# Patient Record
Sex: FEMALE | Race: BLACK OR AFRICAN AMERICAN | Employment: UNEMPLOYED | URBAN - METROPOLITAN AREA
[De-identification: names, ages, dates, MRNs, and addresses within clinical notes are randomized per-mention and may not be internally consistent; named-entity substitution may affect disease eponyms.]

---

## 2017-02-03 ENCOUNTER — HOSPITAL ENCOUNTER (EMERGENCY)
Facility: CLINIC | Age: 27
Discharge: HOME | End: 2017-02-03
Attending: INTERNAL MEDICINE | Admitting: INTERNAL MEDICINE
Payer: COMMERCIAL

## 2017-02-03 ENCOUNTER — APPOINTMENT (OUTPATIENT)
Dept: ULTRASOUND IMAGING | Facility: CLINIC | Age: 27
End: 2017-02-03
Attending: INTERNAL MEDICINE
Payer: COMMERCIAL

## 2017-02-03 VITALS
TEMPERATURE: 97.9 F | HEART RATE: 98 BPM | WEIGHT: 220 LBS | DIASTOLIC BLOOD PRESSURE: 69 MMHG | RESPIRATION RATE: 16 BRPM | HEIGHT: 63 IN | BODY MASS INDEX: 38.98 KG/M2 | OXYGEN SATURATION: 99 % | SYSTOLIC BLOOD PRESSURE: 110 MMHG

## 2017-02-03 DIAGNOSIS — N83.201 CYST OF RIGHT OVARY: ICD-10-CM

## 2017-02-03 DIAGNOSIS — R10.2 PELVIC PAIN IN FEMALE: ICD-10-CM

## 2017-02-03 LAB
ABO + RH BLD: NORMAL
ABO + RH BLD: NORMAL
ALBUMIN SERPL-MCNC: 3.7 G/DL (ref 3.4–5)
ALBUMIN UR-MCNC: NEGATIVE MG/DL
ALP SERPL-CCNC: 59 U/L (ref 40–150)
ALT SERPL W P-5'-P-CCNC: 18 U/L (ref 0–50)
AMORPH CRY #/AREA URNS HPF: ABNORMAL /HPF
ANION GAP SERPL CALCULATED.3IONS-SCNC: 8 MMOL/L (ref 3–14)
APPEARANCE UR: ABNORMAL
AST SERPL W P-5'-P-CCNC: 15 U/L (ref 0–45)
BASOPHILS # BLD AUTO: 0 10E9/L (ref 0–0.2)
BASOPHILS NFR BLD AUTO: 0.4 %
BILIRUB SERPL-MCNC: 0.6 MG/DL (ref 0.2–1.3)
BILIRUB UR QL STRIP: NEGATIVE
BLD GP AB SCN SERPL QL: NORMAL
BLOOD BANK CMNT PATIENT-IMP: NORMAL
BUN SERPL-MCNC: 7 MG/DL (ref 7–30)
CALCIUM SERPL-MCNC: 8.6 MG/DL (ref 8.5–10.1)
CHLORIDE SERPL-SCNC: 109 MMOL/L (ref 94–109)
CO2 SERPL-SCNC: 25 MMOL/L (ref 20–32)
COLOR UR AUTO: YELLOW
CREAT SERPL-MCNC: 0.57 MG/DL (ref 0.52–1.04)
DIFFERENTIAL METHOD BLD: NORMAL
EOSINOPHIL # BLD AUTO: 0.2 10E9/L (ref 0–0.7)
EOSINOPHIL NFR BLD AUTO: 3.6 %
ERYTHROCYTE [DISTWIDTH] IN BLOOD BY AUTOMATED COUNT: 12.6 % (ref 10–15)
GFR SERPL CREATININE-BSD FRML MDRD: NORMAL ML/MIN/1.7M2
GLUCOSE SERPL-MCNC: 83 MG/DL (ref 70–99)
GLUCOSE UR STRIP-MCNC: NEGATIVE MG/DL
HCG UR QL: NEGATIVE
HCT VFR BLD AUTO: 41.5 % (ref 35–47)
HGB BLD-MCNC: 13.3 G/DL (ref 11.7–15.7)
HGB UR QL STRIP: NEGATIVE
HYALINE CASTS #/AREA URNS LPF: 1 /LPF (ref 0–2)
IMM GRANULOCYTES # BLD: 0 10E9/L (ref 0–0.4)
IMM GRANULOCYTES NFR BLD: 0 %
INR PPP: 1.14 (ref 0.86–1.14)
KETONES UR STRIP-MCNC: NEGATIVE MG/DL
LEUKOCYTE ESTERASE UR QL STRIP: NEGATIVE
LIPASE SERPL-CCNC: 103 U/L (ref 73–393)
LYMPHOCYTES # BLD AUTO: 1.5 10E9/L (ref 0.8–5.3)
LYMPHOCYTES NFR BLD AUTO: 28.1 %
MCH RBC QN AUTO: 28.6 PG (ref 26.5–33)
MCHC RBC AUTO-ENTMCNC: 32 G/DL (ref 31.5–36.5)
MCV RBC AUTO: 89 FL (ref 78–100)
MICRO REPORT STATUS: ABNORMAL
MONOCYTES # BLD AUTO: 0.3 10E9/L (ref 0–1.3)
MONOCYTES NFR BLD AUTO: 5.2 %
MUCOUS THREADS #/AREA URNS LPF: PRESENT /LPF
NEUTROPHILS # BLD AUTO: 3.4 10E9/L (ref 1.6–8.3)
NEUTROPHILS NFR BLD AUTO: 62.7 %
NITRATE UR QL: NEGATIVE
NRBC # BLD AUTO: 0 10*3/UL
NRBC BLD AUTO-RTO: 0 /100
PH UR STRIP: 7 PH (ref 5–7)
PLATELET # BLD AUTO: 203 10E9/L (ref 150–450)
POTASSIUM SERPL-SCNC: 4 MMOL/L (ref 3.4–5.3)
PROT SERPL-MCNC: 7.5 G/DL (ref 6.8–8.8)
RBC # BLD AUTO: 4.65 10E12/L (ref 3.8–5.2)
RBC #/AREA URNS AUTO: 2 /HPF (ref 0–2)
SODIUM SERPL-SCNC: 142 MMOL/L (ref 133–144)
SP GR UR STRIP: 1.01 (ref 1–1.03)
SPECIMEN EXP DATE BLD: NORMAL
SPECIMEN SOURCE: ABNORMAL
SQUAMOUS #/AREA URNS AUTO: 1 /HPF (ref 0–1)
TSH SERPL DL<=0.005 MIU/L-ACNC: 2.26 MU/L (ref 0.4–4)
URN SPEC COLLECT METH UR: ABNORMAL
UROBILINOGEN UR STRIP-MCNC: NORMAL MG/DL (ref 0–2)
WBC # BLD AUTO: 5.3 10E9/L (ref 4–11)
WBC #/AREA URNS AUTO: 1 /HPF (ref 0–2)
WET PREP SPEC: ABNORMAL

## 2017-02-03 PROCEDURE — 93976 VASCULAR STUDY: CPT

## 2017-02-03 PROCEDURE — 25000132 ZZH RX MED GY IP 250 OP 250 PS 637: Performed by: INTERNAL MEDICINE

## 2017-02-03 PROCEDURE — 86850 RBC ANTIBODY SCREEN: CPT | Performed by: INTERNAL MEDICINE

## 2017-02-03 PROCEDURE — 85610 PROTHROMBIN TIME: CPT | Performed by: INTERNAL MEDICINE

## 2017-02-03 PROCEDURE — 99284 EMERGENCY DEPT VISIT MOD MDM: CPT | Mod: 25 | Performed by: INTERNAL MEDICINE

## 2017-02-03 PROCEDURE — 80053 COMPREHEN METABOLIC PANEL: CPT | Performed by: INTERNAL MEDICINE

## 2017-02-03 PROCEDURE — 96374 THER/PROPH/DIAG INJ IV PUSH: CPT | Performed by: INTERNAL MEDICINE

## 2017-02-03 PROCEDURE — 84443 ASSAY THYROID STIM HORMONE: CPT | Performed by: INTERNAL MEDICINE

## 2017-02-03 PROCEDURE — 87591 N.GONORRHOEAE DNA AMP PROB: CPT | Performed by: INTERNAL MEDICINE

## 2017-02-03 PROCEDURE — 99284 EMERGENCY DEPT VISIT MOD MDM: CPT | Mod: Z6 | Performed by: INTERNAL MEDICINE

## 2017-02-03 PROCEDURE — 87491 CHLMYD TRACH DNA AMP PROBE: CPT | Performed by: INTERNAL MEDICINE

## 2017-02-03 PROCEDURE — 83690 ASSAY OF LIPASE: CPT | Performed by: INTERNAL MEDICINE

## 2017-02-03 PROCEDURE — 87210 SMEAR WET MOUNT SALINE/INK: CPT | Performed by: INTERNAL MEDICINE

## 2017-02-03 PROCEDURE — 96361 HYDRATE IV INFUSION ADD-ON: CPT | Performed by: INTERNAL MEDICINE

## 2017-02-03 PROCEDURE — 81001 URINALYSIS AUTO W/SCOPE: CPT | Performed by: INTERNAL MEDICINE

## 2017-02-03 PROCEDURE — 81025 URINE PREGNANCY TEST: CPT | Performed by: INTERNAL MEDICINE

## 2017-02-03 PROCEDURE — 96360 HYDRATION IV INFUSION INIT: CPT | Performed by: INTERNAL MEDICINE

## 2017-02-03 PROCEDURE — 86901 BLOOD TYPING SEROLOGIC RH(D): CPT | Performed by: INTERNAL MEDICINE

## 2017-02-03 PROCEDURE — 86900 BLOOD TYPING SEROLOGIC ABO: CPT | Performed by: INTERNAL MEDICINE

## 2017-02-03 PROCEDURE — 85025 COMPLETE CBC W/AUTO DIFF WBC: CPT | Performed by: INTERNAL MEDICINE

## 2017-02-03 PROCEDURE — 25000128 H RX IP 250 OP 636: Performed by: INTERNAL MEDICINE

## 2017-02-03 RX ORDER — KETOROLAC TROMETHAMINE 30 MG/ML
30 INJECTION, SOLUTION INTRAMUSCULAR; INTRAVENOUS ONCE
Status: COMPLETED | OUTPATIENT
Start: 2017-02-03 | End: 2017-02-03

## 2017-02-03 RX ORDER — TRAMADOL HYDROCHLORIDE 50 MG/1
50 TABLET ORAL ONCE
Status: COMPLETED | OUTPATIENT
Start: 2017-02-03 | End: 2017-02-03

## 2017-02-03 RX ORDER — TRAMADOL HYDROCHLORIDE 50 MG/1
50 TABLET ORAL EVERY 6 HOURS PRN
Qty: 10 TABLET | Refills: 0 | Status: SHIPPED | OUTPATIENT
Start: 2017-02-03 | End: 2017-07-11

## 2017-02-03 RX ADMIN — KETOROLAC TROMETHAMINE 30 MG: 30 INJECTION, SOLUTION INTRAMUSCULAR at 08:45

## 2017-02-03 RX ADMIN — TRAMADOL HYDROCHLORIDE 50 MG: 50 TABLET, COATED ORAL at 11:21

## 2017-02-03 RX ADMIN — SODIUM CHLORIDE 1000 ML: 9 INJECTION, SOLUTION INTRAVENOUS at 08:45

## 2017-02-03 ASSESSMENT — ENCOUNTER SYMPTOMS
ABDOMINAL PAIN: 1
DYSURIA: 0
VOMITING: 0
NAUSEA: 0
FEVER: 0

## 2017-02-03 NOTE — DISCHARGE INSTRUCTIONS
Please make an appointment to follow up with Your Gynecologist in 5-10 days even if entirely better.

## 2017-02-03 NOTE — ED AVS SNAPSHOT
[facility], Emergency Department    [address]    [place] [address]    Phone:  [phone]    Fax:  [phone]                                       Renetta Jolly   MRN: [id number]    Department:  [facility], Emergency Department   Date of Visit:  2/3/2017           Patient Information     Date Of Birth          1990        Your diagnoses for this visit were:     Pelvic pain in female     Cyst of right ovary        You were seen by Gayatri Hill MD.        Discharge Instructions       Please make an appointment to follow up with Your Gynecologist in 5-10 days even if entirely better.     Discharge References/Attachments     PELVIC PAIN, UNKNOWN CAUSE (ENGLISH)    OVARIAN CYST (ENGLISH)      24 Hour Appointment Hotline       To make an appointment at any [facility] clinic, call [phone] ([phone]). If you don't have a family doctor or clinic, we will help you find one. [facility] clinics are conveniently located to serve the needs of you and your family.             Review of your medicines      CONTINUE these medicines which may have CHANGED, or have new prescriptions. If we are uncertain of the size of tablets/capsules you have at home, strength may be listed as something that might have changed.        Dose / Directions Last dose taken    traMADol 50 MG tablet   Commonly known as:  ULTRAM   Dose:  50 mg   What changed:    - how much to take  - when to take this   Quantity:  10 tablet        Take 1 tablet (50 mg) by mouth every 6 hours as needed for moderate pain   Refills:  0          Our records show that you are taking the medicines listed below. If these are incorrect, please call your family doctor or clinic.        Dose / Directions Last dose taken    LEVOTHYROXINE SODIUM PO   Dose:  88 mcg        Take 88 mcg by mouth daily Patient does not know the dose.   Refills:  0        NAPROXEN PO        Take by mouth as needed for moderate pain   Refills:  0        polyethylene  glycol powder   Commonly known as:  MIRALAX   Dose:  1 capful   Quantity:  510 g        Take 17 g (1 capful) by mouth daily   Refills:  1        sennosides 8.6 MG tablet   Commonly known as:  SENOKOT   Dose:  2 tablet   Quantity:  120 tablet        Take 2 tablets by mouth 2 times daily   Refills:  1        TYLENOL PO   Dose:  500 mg        Take 500 mg by mouth   Refills:  0                Prescriptions were sent or printed at these locations (1 Prescription)                   Other Prescriptions                Printed at Department/Unit printer (1 of 1)         traMADol (ULTRAM) 50 MG tablet                Procedures and tests performed during your visit     ABO/Rh type and screen    CBC with platelets differential    Chlamydia trachomatis PCR    Comprehensive metabolic panel    HCG qualitative urine    INR    Lipase    Neisseria gonorrhoea PCR    Prep for procedure - pelvic exam    TSH with free T4 reflex    UA reflex to Microscopic and Culture    US Pelvic Complete w Transvaginal & Abd/Pel Duplex Limited    Wet prep      Orders Needing Specimen Collection     None      Pending Results     Date and Time Order Name Status Description    2/3/2017 0853 Neisseria gonorrhoea PCR In process     2/3/2017 0853 Chlamydia trachomatis PCR In process             Pending Culture Results     Date and Time Order Name Status Description    2/3/2017 0853 Neisseria gonorrhoea PCR In process     2/3/2017 0853 Chlamydia trachomatis PCR In process             Thank you for choosing [facility]       Thank you for choosing [facility] for your care. Our goal is always to provide you with excellent care. Hearing back from our patients is one way we can continue to improve our services. Please take a few minutes to complete the written survey that you may receive in the mail after you visit with us. Thank you!        [employer] Information     [employer] lets you send messages to your doctor, view your test results, renew your prescriptions, schedule  "appointments and more. To sign up, go to [email or web address] . Click on \"Log in\" on the left side of the screen, which will take you to the Welcome page. Then click on \"Sign up Now\" on the right side of the page.     You will be asked to enter the access code listed below, as well as some personal information. Please follow the directions to create your username and password.     Your access code is: [email or web address]  Expires: 2017 11:53 AM     Your access code will  in 90 days. If you need help or a new code, please call your [facility] clinic or [phone].        Care EveryWhere ID     This is your Care EveryWhere ID. This could be used by other organizations to access your [facility] medical records  [id number]        After Visit Summary       This is your record. Keep this with you and show to your community pharmacist(s) and doctor(s) at your next visit.                  "

## 2017-02-03 NOTE — ED AVS SNAPSHOT
[facility], [place], Emergency Department    [address]    [facility] [address]    Phone:  [phone]    Fax:  [phone]                                       Renetta Jolly   MRN: [id number]    Department:  [facility], Emergency Department   Date of Visit:  2/3/2017           After Visit Summary Signature Page     I have received my discharge instructions, and my questions have been answered. I have discussed any challenges I see with this plan with the nurse or doctor.    ..........................................................................................................................................  Patient/Patient Representative Signature      ..........................................................................................................................................  Patient Representative Print Name and Relationship to Patient    ..................................................               ................................................  Date                                            Time    ..........................................................................................................................................  Reviewed by Signature/Title    ...................................................              ..............................................  Date                                                            Time

## 2017-02-03 NOTE — ED PROVIDER NOTES
"  History     Chief Complaint   Patient presents with     Abdominal Pain     RLQ abd pain feels like her right ovarian cyst that she was dx with.  Pain started yesterday.     HPI  Renetta Jolly is a 26 year old female with a history of right sided ovarian cyst, last ultrasound back in 10/2016, presents now with a 2 day history of right and left lower abdominal discomfort and also pelvic discomfort. She denies any discharge or any dysuria or any nausea, vomiting or fevers. She is worried that the cyst may be getting larger.     I have reviewed the Medications, Allergies, Past Medical and Surgical History, and Social History in the "[employer] system.    Past Medical History   Diagnosis Date     Intermittent asthma 2013     SAB (spontaneous )      Thyroid disease        Past Surgical History   Procedure Laterality Date     No history of surgery         Family History   Problem Relation Age of Onset     DIABETES Maternal Uncle      Asthma Mother      CEREBROVASCULAR DISEASE Father        Social History   Substance Use Topics     Smoking status: Never Smoker      Smokeless tobacco: Never Used     Alcohol Use: No     No current facility-administered medications for this encounter.     Current Outpatient Prescriptions   Medication     NAPROXEN PO     traMADol (ULTRAM) 50 MG tablet     LEVOTHYROXINE SODIUM PO     sennosides (SENOKOT) 8.6 MG tablet     polyethylene glycol (MIRALAX) powder     Acetaminophen (TYLENOL PO)      No Known Allergies    Review of Systems   Constitutional: Negative for fever.   Gastrointestinal: Positive for abdominal pain (bilateral lower). Negative for nausea and vomiting.   Genitourinary: Positive for pelvic pain. Negative for dysuria and vaginal discharge.   All other systems reviewed and are negative.      Physical Exam   BP: 110/69 mmHg  Pulse: 98  Temp: 97.9  F (36.6  C)  Resp: 16  Height: 160 cm (5' 3\")  Weight: 99.791 kg (220 lb)  SpO2: 99 %  Physical Exam   Constitutional: No " distress.   HENT:   Head: Atraumatic.   Mouth/Throat: Oropharynx is clear and moist. No oropharyngeal exudate.   Eyes: Pupils are equal, round, and reactive to light. No scleral icterus.   Neck: Neck supple. No JVD present.   Cardiovascular: Normal rate, normal heart sounds and intact distal pulses.  Exam reveals no gallop and no friction rub.    No murmur heard.  Pulmonary/Chest: Effort normal and breath sounds normal. No respiratory distress. She has no wheezes. She has no rales. She exhibits no tenderness.   Abdominal: Soft. Bowel sounds are normal. She exhibits no distension and no mass. There is no hepatosplenomegaly. There is tenderness in the right lower quadrant and suprapubic area. There is no rigidity, no rebound, no guarding, no CVA tenderness, no tenderness at McBurney's point and negative Norris's sign. No hernia. Hernia confirmed negative in the right inguinal area and confirmed negative in the left inguinal area.       Genitourinary: Vagina normal and uterus normal. Cervix exhibits discharge. Cervix exhibits no motion tenderness and no friability. Right adnexum displays no mass, no tenderness and no fullness. Left adnexum displays no mass, no tenderness and no fullness.   Musculoskeletal: She exhibits no edema or tenderness.   Lymphadenopathy:        Right: No inguinal adenopathy present.        Left: No inguinal adenopathy present.   Skin: Skin is warm. No rash noted. She is not diaphoretic.       ED Course     Procedures        Results for orders placed or performed during the hospital encounter of 02/03/17 (from the past 24 hour(s))   UA reflex to Microscopic and Culture     Status: Abnormal    Collection Time: 02/03/17  8:17 AM   Result Value Ref Range    Color Urine Yellow     Appearance Urine Slightly Cloudy     Glucose Urine Negative NEG mg/dL    Bilirubin Urine Negative NEG    Ketones Urine Negative NEG mg/dL    Specific Gravity Urine 1.013 1.003 - 1.035    Blood Urine Negative NEG    pH Urine  7.0 5.0 - 7.0 pH    Protein Albumin Urine Negative NEG mg/dL    Urobilinogen mg/dL Normal 0.0 - 2.0 mg/dL    Nitrite Urine Negative NEG    Leukocyte Esterase Urine Negative NEG    Source Midstream Urine     RBC Urine 2 0 - 2 /HPF    WBC Urine 1 0 - 2 /HPF    Squamous Epithelial /HPF Urine 1 0 - 1 /HPF    Mucous Urine Present (A) NEG /LPF    Hyaline Casts 1 0 - 2 /LPF    Amorphous Crystals Few (A) NEG /HPF   HCG qualitative urine     Status: None    Collection Time: 02/03/17  8:17 AM   Result Value Ref Range    HCG Qual Urine Negative NEG   CBC with platelets differential     Status: None    Collection Time: 02/03/17  8:33 AM   Result Value Ref Range    WBC 5.3 4.0 - 11.0 10e9/L    RBC Count 4.65 3.8 - 5.2 10e12/L    Hemoglobin 13.3 11.7 - 15.7 g/dL    Hematocrit 41.5 35.0 - 47.0 %    MCV 89 78 - 100 fl    MCH 28.6 26.5 - 33.0 pg    MCHC 32.0 31.5 - 36.5 g/dL    RDW 12.6 10.0 - 15.0 %    Platelet Count 203 150 - 450 10e9/L    Diff Method Automated Method     % Neutrophils 62.7 %    % Lymphocytes 28.1 %    % Monocytes 5.2 %    % Eosinophils 3.6 %    % Basophils 0.4 %    % Immature Granulocytes 0.0 %    Nucleated RBCs 0 0 /100    Absolute Neutrophil 3.4 1.6 - 8.3 10e9/L    Absolute Lymphocytes 1.5 0.8 - 5.3 10e9/L    Absolute Monocytes 0.3 0.0 - 1.3 10e9/L    Absolute Eosinophils 0.2 0.0 - 0.7 10e9/L    Absolute Basophils 0.0 0.0 - 0.2 10e9/L    Abs Immature Granulocytes 0.0 0 - 0.4 10e9/L    Absolute Nucleated RBC 0.0    INR     Status: None    Collection Time: 02/03/17  8:33 AM   Result Value Ref Range    INR 1.14 0.86 - 1.14   ABO/Rh type and screen     Status: None    Collection Time: 02/03/17  8:33 AM   Result Value Ref Range    ABO O     RH(D)  Pos     Antibody Screen Neg     Test Valid Only At       [facility]    Specimen Expires 02/06/2017    Comprehensive metabolic panel     Status: None    Collection Time: 02/03/17  8:33 AM   Result Value Ref Range    Sodium 142  133 - 144 mmol/L    Potassium 4.0 3.4 - 5.3 mmol/L    Chloride 109 94 - 109 mmol/L    Carbon Dioxide 25 20 - 32 mmol/L    Anion Gap 8 3 - 14 mmol/L    Glucose 83 70 - 99 mg/dL    Urea Nitrogen 7 7 - 30 mg/dL    Creatinine 0.57 0.52 - 1.04 mg/dL    GFR Estimate >90  Non  GFR Calc   >60 mL/min/1.7m2    GFR Estimate If Black >90   GFR Calc   >60 mL/min/1.7m2    Calcium 8.6 8.5 - 10.1 mg/dL    Bilirubin Total 0.6 0.2 - 1.3 mg/dL    Albumin 3.7 3.4 - 5.0 g/dL    Protein Total 7.5 6.8 - 8.8 g/dL    Alkaline Phosphatase 59 40 - 150 U/L    ALT 18 0 - 50 U/L    AST 15 0 - 45 U/L   Lipase     Status: None    Collection Time: 02/03/17  8:33 AM   Result Value Ref Range    Lipase 103 73 - 393 U/L   TSH with free T4 reflex     Status: None    Collection Time: 02/03/17  8:33 AM   Result Value Ref Range    TSH 2.26 0.40 - 4.00 mU/L   US Pelvic Complete w Transvaginal & Abd/Pel Duplex Limited     Status: None    Collection Time: 02/03/17  9:52 AM    Narrative    ULTRASOUND PELVIS COMPLETE WITH TRANSVAGINAL IMAGING AND DOPPLER  LIMITED  2/3/2017 9:52 AM     HISTORY: Pelvic pain.    FINDINGS:  Transvaginal images were performed to better evaluate the  patient's uterus, ovaries and endometrial stripe.    The uterus is normal in size measuring 7.8 x 4.3 x 5.1 cm. No fibroids  are evident. Endometrial stripe measures 11 mm and is normal for  patient's age. Endometrium demonstrates a normal configuration on  3-dimensional imaging. The right ovary measures 2.6 x 3.1 x 2.2 cm and  contains a complex cystic lesion measuring 2.0 cm, possibly a corpus  luteal cyst. The left ovary is normal measuring 2.4 x 3.3 x 0.8 cm.  Color Doppler waveform analysis demonstrates normal arterial waveforms  within each ovary. Large simple-appearing cystic lesion is noted in  the right adnexa just lateral to the right ovary, possibly a  paraovarian cyst or pedunculated cyst, measuring 3.0 x 3.4 x 2.2 cm.  No associated abnormal  color Doppler flow. A small amount of free  pelvic fluid is present.      Impression    IMPRESSION:   1. Probable corpus luteal cyst right ovary and a larger pedunculated  or paraovarian simple-appearing cystic lesion measuring 3.4 cm.  Followup ultrasound in two or three months could be performed to  assess for resolution.  2. No ultrasound evidence of ovarian torsion. Uterus and endometrial  stripe are unremarkable. Left ovary is within normal limits.    BECKY SIMON MD   Wet prep     Status: Abnormal    Collection Time: 02/03/17 11:00 AM   Result Value Ref Range    Specimen Description Genital     Wet Prep (A)      Few WBC'S seen  Rare Clue cells seen  No yeast seen  No Trichomonas seen      Micro Report Status FINAL 02/03/2017              Labs Ordered and Resulted from Time of ED Arrival Up to the Time of Departure from the ED   UA MACROSCOPIC WITH REFLEX TO MICRO AND CULTURE - Abnormal; Notable for the following:     Mucous Urine Present (*)     Amorphous Crystals Few (*)     All other components within normal limits   WET PREP - Abnormal; Notable for the following:     Wet Prep   (*)     Value: Few WBC'S seen  Rare Clue cells seen  No yeast seen  No Trichomonas seen      All other components within normal limits   CBC WITH PLATELETS DIFFERENTIAL   INR   COMPREHENSIVE METABOLIC PANEL   LIPASE   TSH WITH FREE T4 REFLEX   HCG QUALITATIVE URINE   PREP FOR PROCEDURE   ABO/RH TYPE AND SCREEN   CHLAMYDIA TRACHOMATIS PCR   NEISSERIA GONORRHOEAE PCR       Assessments & Plan (with Medical Decision Making)  Right greater than left pelvic pain with ovarian cysts, no evidence of tortion, improving with toradol and tramadol, wet prep and other labs ok, will DC with tramadol prn and follow up with her Gyn.       I have reviewed the nursing notes.    I have reviewed the findings, diagnosis, plan and need for follow up with the patient.    Discharge Medication List as of 2/3/2017 11:55 AM          Final diagnoses:   Pelvic  pain in female   Cyst of right ovary   I, Latia Martinez, am serving as a trained medical scribe to document services personally performed by Gayatri Hill MD, based on the provider's statements to me.      IGayatri MD, was physically present and have reviewed and verified the accuracy of this note documented by Latia Martinez.       2/3/2017   [facility], [place], EMERGENCY DEPARTMENT      Gayatri Hill MD  02/03/17 2912

## 2017-02-05 LAB
C TRACH DNA SPEC QL NAA+PROBE: NORMAL
N GONORRHOEA DNA SPEC QL NAA+PROBE: NORMAL
SPECIMEN SOURCE: NORMAL
SPECIMEN SOURCE: NORMAL

## 2017-05-30 ENCOUNTER — TELEPHONE (OUTPATIENT)
Dept: FAMILY MEDICINE | Facility: CLINIC | Age: 27
End: 2017-05-30

## 2017-05-30 NOTE — TELEPHONE ENCOUNTER
Quality letter mailed to patient as a reminder of HM items due, and ACT questionnaire also mailed along with this letter.  ELIZABETH Caruso MA

## 2017-05-30 NOTE — TELEPHONE ENCOUNTER
Panel Management Review      Patient has the following on her problem list:     Asthma review     ACT Total Scores 11/13/2013   ACT TOTAL SCORE 25   ASTHMA ER VISITS 0 = None   ASTHMA HOSPITALIZATIONS 0 = None      1. Is Asthma diagnosis on the Problem List? Yes    2. Is Asthma listed on Health Maintenance? Yes    3. Patient is due for:  ACT      Composite cancer screening  Chart review shows that this patient is due/due soon for the following Pap Smear  Summary:    Patient is due/failing the following:   ACT and PAP    Action needed:   Patient needs office visit for physical with pap screen. Patient needs to do ACT.    Type of outreach:    Phone, left message for patient to call back.     Questions for provider review:    None                                                                                                                                    ELIZABETH Caruso MA       Chart routed to Care Team .

## 2017-05-30 NOTE — LETTER
May 30, 2017    Renetta Jolly  [address]    [facility] [address]    Dear Renetta    We care about your health and have reviewed your health plan. We have reviewed your medical conditions, medication list, and lab results and are making recommendations based on this review, to better manage your health.    You are in particular need of attention regarding:  - Your Asthma  - Scheduling a Physical with a Cervical Cancer Screening (Pap Smear) age 64 and younger [phone]  ACT questionnaire mailed along with this letter    Here is a list of Health Maintenance topics that are due now or due soon:  Health Maintenance Due   Topic Date Due     ASTHMA CONTROL TEST Q6 MOS  05/13/2014     ASTHMA ACTION PLAN Q1 YR  11/13/2014     PAP SCREENING Q3 YR (SYSTEM ASSIGNED)  04/29/2017     We will be calling you in the next couple of weeks to help you schedule any appointments that are needed.  Please call us at [phone] (or use [employer]) to address the above recommendations.     Thank you for trusting [facility] and we appreciate the opportunity to serve you.  We look forward to supporting your healthcare needs in the future.    Healthy Regards,    Melisa Butler, CNP

## 2017-05-30 NOTE — LETTER
June 5, 2017    Renetta Jolly  [address]    [facility] [address]      Dear Renetta Jolly,     We have tried to contact you about your health, but have been unable to reach you.  Please call us as soon as possible so we can provide you with the best care possible.  We will continue to check in with you throughout the year to complete these items of care, if you are not able to complete these items at this time.  If you would like to complete the missing items for your care, please contact us at [phone].    We recommend the following:  -schedule a PAP SMEAR EXAM which is due.  Please disregard this reminder if you have had this exam elsewhere within the last year.  It would be helpful for us to have a copy of your recent pap smear report in our file so that we can best coordinate your care.  Please fill the the ACT questionnaire that we sent you a week ago and please mail back, thank you.  Sincerely,     Your Care Team at [facility]

## 2017-06-27 ENCOUNTER — OFFICE VISIT (OUTPATIENT)
Dept: FAMILY MEDICINE | Facility: CLINIC | Age: 27
End: 2017-06-27
Payer: COMMERCIAL

## 2017-06-27 VITALS
HEIGHT: 63 IN | OXYGEN SATURATION: 99 % | BODY MASS INDEX: 37.03 KG/M2 | HEART RATE: 79 BPM | WEIGHT: 209 LBS | SYSTOLIC BLOOD PRESSURE: 97 MMHG | TEMPERATURE: 98.9 F | DIASTOLIC BLOOD PRESSURE: 65 MMHG

## 2017-06-27 DIAGNOSIS — R10.31 RLQ ABDOMINAL PAIN: Primary | ICD-10-CM

## 2017-06-27 DIAGNOSIS — Z87.42 HISTORY OF OVARIAN CYST: ICD-10-CM

## 2017-06-27 LAB
ALBUMIN UR-MCNC: ABNORMAL MG/DL
APPEARANCE UR: ABNORMAL
BACTERIA #/AREA URNS HPF: ABNORMAL /HPF
BETA HCG QUAL IFA URINE: NEGATIVE
BILIRUB UR QL STRIP: ABNORMAL
COLOR UR AUTO: YELLOW
GLUCOSE UR STRIP-MCNC: NEGATIVE MG/DL
HGB UR QL STRIP: NEGATIVE
KETONES UR STRIP-MCNC: ABNORMAL MG/DL
LEUKOCYTE ESTERASE UR QL STRIP: NEGATIVE
MUCOUS THREADS #/AREA URNS LPF: PRESENT /LPF
NITRATE UR QL: NEGATIVE
NON-SQ EPI CELLS #/AREA URNS LPF: ABNORMAL /LPF
PH UR STRIP: 5.5 PH (ref 5–7)
RBC #/AREA URNS AUTO: ABNORMAL /HPF (ref 0–2)
SP GR UR STRIP: >1.03 (ref 1–1.03)
URN SPEC COLLECT METH UR: ABNORMAL
UROBILINOGEN UR STRIP-ACNC: 1 EU/DL (ref 0.2–1)
WBC #/AREA URNS AUTO: ABNORMAL /HPF (ref 0–2)

## 2017-06-27 PROCEDURE — 99214 OFFICE O/P EST MOD 30 MIN: CPT | Performed by: NURSE PRACTITIONER

## 2017-06-27 PROCEDURE — 84703 CHORIONIC GONADOTROPIN ASSAY: CPT | Performed by: NURSE PRACTITIONER

## 2017-06-27 PROCEDURE — 81001 URINALYSIS AUTO W/SCOPE: CPT | Performed by: NURSE PRACTITIONER

## 2017-06-27 ASSESSMENT — ENCOUNTER SYMPTOMS
CONSTIPATION: 0
SHORTNESS OF BREATH: 0
FALLS: 0
DIARRHEA: 0
HEMATURIA: 0
NAUSEA: 1
COUGH: 0
FREQUENCY: 0
HEARTBURN: 0
ABDOMINAL PAIN: 1
BACK PAIN: 1
DYSURIA: 0

## 2017-06-27 NOTE — PATIENT INSTRUCTIONS
Please call [facility] to schedule your test: [phone]    We will talk after I see the results of your test    Please schedule an appointment to establish care either in our clinic or with an OB/GYN for now

## 2017-06-27 NOTE — NURSING NOTE
"Chief Complaint   Patient presents with     Abdominal Pain       Initial BP 97/65 (BP Location: Right arm, Cuff Size: Adult Large)  Pulse 79  Temp 98.9  F (37.2  C) (Oral)  Ht 5' 3\" (1.6 m)  Wt 209 lb (94.8 kg)  LMP 06/05/2017 (Exact Date)  SpO2 99%  BMI 37.02 kg/m2 Estimated body mass index is 37.02 kg/(m^2) as calculated from the following:    Height as of this encounter: 5' 3\" (1.6 m).    Weight as of this encounter: 209 lb (94.8 kg).  Medication Reconciliation: complete       Jelena Jackson CMA      "

## 2017-06-27 NOTE — PROGRESS NOTES
HPI    SUBJECTIVE:                                                    Renetta Jolly is a 26 year old female who presents to clinic today for the following health issues:      Abdominal Pain      Duration: 3 days    Description (location/character/radiation): RLQ       Associated flank pain: Yes    Intensity:  severe    Accompanying signs and symptoms:        Fever/Chills: no        Gas/Bloating: YES, bloating       Nausea/vomitting: YES, nausea       Diarrhea: no        Dysuria or Hematuria: no     History (previous similar pain/trauma/previous testing): Yes,history of right sided ovarian cyst      Precipitating or alleviating factors:       Pain worse with eating/BM/urination: No       Pain relieved by BM: no     Therapies tried and outcome: None    LMP:  2017        Patient with history of right ovarian cyst presents with generalized abdominal pain which started three days ago.  It is worse in her lower abdomen and is more severe towards the lower right. Her pain is 10/10 with lying down and rates 9/10 when sitting and ambulating. She had difficulty sleeping last night so she took Tramadol, but it did not relief her pain.  She reported nausea along with the pain. She denies fever, vomiting, burning urination, hematuria, vaginal itch and discharge. She does complain of vaginal pain and painful coitus.     Patient also reports back pain for about three days that radiates to the right leg which gets worse with massage, rt leg extension and bending. She currently does not work and is not sure what caused the back pain.       Past Medical History:   Diagnosis Date     Intermittent asthma 2013     SAB (spontaneous )      Thyroid disease      Past Surgical History:   Procedure Laterality Date     NO HISTORY OF SURGERY       Social History   Substance Use Topics     Smoking status: Never Smoker     Smokeless tobacco: Never Used     Alcohol use No     Current Outpatient Prescriptions   Medication Sig  "Dispense Refill     NAPROXEN PO Take by mouth as needed for moderate pain       traMADol (ULTRAM) 50 MG tablet Take 1 tablet (50 mg) by mouth every 6 hours as needed for moderate pain 10 tablet 0     sennosides (SENOKOT) 8.6 MG tablet Take 2 tablets by mouth 2 times daily 120 tablet 1     polyethylene glycol (MIRALAX) powder Take 17 g (1 capful) by mouth daily 510 g 1     Acetaminophen (TYLENOL PO) Take 500 mg by mouth       LEVOTHYROXINE SODIUM PO Take 88 mcg by mouth daily Patient does not know the dose.       Allergies   Allergen Reactions     No Known Allergies        Reviewed and updated as needed this visit by clinical staff and provider      Review of Systems   Respiratory: Negative for cough and shortness of breath.    Cardiovascular: Negative for chest pain.   Gastrointestinal: Positive for abdominal pain and nausea. Negative for constipation, diarrhea and heartburn.   Genitourinary: Negative for dysuria, frequency and hematuria.   Musculoskeletal: Positive for back pain. Negative for falls.        Back pain radiating to right leg.   Skin: Negative for rash.         BP 97/65 (BP Location: Right arm, Cuff Size: Adult Large)  Pulse 79  Temp 98.9  F (37.2  C) (Oral)  Ht 5' 3\" (1.6 m)  Wt 209 lb (94.8 kg)  LMP 06/05/2017 (Exact Date)  SpO2 99%  BMI 37.02 kg/m2      Physical Exam   Constitutional: She is well-developed, well-nourished, and in no distress.   HENT:   Head: Normocephalic and atraumatic.   Eyes: Conjunctivae are normal.   Cardiovascular: Normal rate, regular rhythm and normal heart sounds.    No murmur heard.  Pulmonary/Chest: Effort normal. No respiratory distress.   Abdominal: Soft. She exhibits no distension. There is tenderness.   Generalized tenderness but greatest of RLQ   Musculoskeletal: Normal range of motion.   Neurological: She is alert.   Skin: Skin is warm and dry.   Psychiatric: Mood and affect normal.         Assessment and Plan:       ICD-10-CM    1. RLQ abdominal pain R10.31 " UA with Microscopic reflex to Culture     Beta HCG qual IFA urine     US Pelvic Complete w Transvaginal     OB/GYN REFERRAL   2. History of ovarian cyst Z87.42        Unknown etiology of abd pain but most likely ovarian cyst  Neg UA and UPT   Will complete US considering her history of ovarian cyst   Also referred to OB/GYN as she will likely need f/u regarding cyst and also needs routine exam plus she is trying to get pregnant currently       Christopher Lewis, APRN, CNP  [facility]

## 2017-06-27 NOTE — PROGRESS NOTES
"  SUBJECTIVE:                                                    Renetta Jolly is a 26 year old female who presents to clinic today for the following health issues:      Abdominal Pain      Duration: 3 days    Description (location/character/radiation): RLQ       Associated flank pain: Yes    Intensity:  severe    Accompanying signs and symptoms:        Fever/Chills: no        Gas/Bloating: YES, bloating       Nausea/vomitting: YES, nausea       Diarrhea: no        Dysuria or Hematuria: no     History (previous similar pain/trauma/previous testing): Yes,history of right sided ovarian cyst      Precipitating or alleviating factors:       Pain worse with eating/BM/urination: No       Pain relieved by BM: no     Therapies tried and outcome: None    LMP:  06/05/2017      {additional problems for provider to add:779757}    Problem list and histories reviewed & adjusted, as indicated.  Additional history: {NONE - AS DOCUMENTED:372906::\"as documented\"}    {HIST REVIEW/ LINKS 2:067573}                 {PROVIDER CHARTING PREFERENCE:151114}  "

## 2017-06-27 NOTE — MR AVS SNAPSHOT
After Visit Summary   6/27/2017    Renetta Jolly    MRN: [id number]           Patient Information     Date Of Birth          1990        Visit Information        Provider Department      6/27/2017 9:30 AM Christopher Lewis APRN CNP [facility]        Today's Diagnoses     RLQ abdominal pain    -  1    History of ovarian cyst          Care Instructions    Please call [facility] to schedule your test: [phone]    We will talk after I see the results of your test    Please schedule an appointment to establish care either in our clinic or with an OB/GYN for now             Follow-ups after your visit        Additional Services     OB/GYN REFERRAL       Your provider has referred you to:  [facility]: [facility] [phone]  [email or web address]    Please be aware that coverage of these services is subject to the terms and limitations of your health insurance plan.  Call member services at your health plan with any benefit or coverage questions.      Please bring the following with you to your appointment:    (1) Any X-Rays, CTs or MRIs which have been performed.  Contact the facility where they were done to arrange for  prior to your scheduled appointment.   (2) List of current medications   (3) This referral request   (4) Any documents/labs given to you for this referral                  Future tests that were ordered for you today     Open Future Orders        Priority Expected Expires Ordered    US Pelvic Complete w Transvaginal Routine  6/27/2018 6/27/2017            Who to contact     If you have questions or need follow up information about today's clinic visit or your schedule please contact [facility] directly at [phone].  Normal or non-critical lab and imaging results will be communicated to you by MyChart, letter or phone within 4 business days after the clinic has received the results. If  "you do not hear from us within 7 days, please contact the clinic through [employer]hart or phone. If you have a critical or abnormal lab result, we will notify you by phone as soon as possible.  Submit refill requests through [employer] or call your pharmacy and they will forward the refill request to us. Please allow 3 business days for your refill to be completed.          Additional Information About Your Visit        Care EveryWhere ID     This is your Care EveryWhere ID. This could be used by other organizations to access your [facility] medical records  [id number]        Your Vitals Were     Pulse Temperature Height Last Period Pulse Oximetry BMI (Body Mass Index)    79 98.9  F (37.2  C) (Oral) 5' 3\" (1.6 m) 06/05/2017 (Exact Date) 99% 37.02 kg/m2       Blood Pressure from Last 3 Encounters:   06/27/17 97/65   02/03/17 110/69   12/04/16 109/49    Weight from Last 3 Encounters:   06/27/17 209 lb (94.8 kg)   02/03/17 220 lb (99.8 kg)   12/04/16 226 lb (102.5 kg)              We Performed the Following     Beta HCG qual IFA urine     OB/GYN REFERRAL     UA with Microscopic reflex to Culture        Primary Care Provider    Physician No Ref-Primary       No address on file        Equal Access to Services     LD PUTNAM : Hadii lia pintoo Noa, waaxda luqadaha, qaybta kaalmada adeegyada, reece gupta. So [facility] [phone].    ATENCIÓN: Si habla español, tiene a velazquez disposición servicios gratuitos de asistencia lingüística. Llame al [phone].    We comply with applicable federal civil rights laws and [place] laws. We do not discriminate on the basis of race, color, national origin, age, disability sex, sexual orientation or gender identity.            Thank you!     Thank you for choosing [facility]  for your care. Our goal is always to provide you with excellent care. Hearing back from our patients is one way we can continue to improve our services. Please take a few " minutes to complete the written survey that you may receive in the mail after your visit with us. Thank you!             Your Updated Medication List - Protect others around you: Learn how to safely use, store and throw away your medicines at www.disposemymeds.org.          This list is accurate as of: 6/27/17 10:20 AM.  Always use your most recent med list.                   Brand Name Dispense Instructions for use Diagnosis    LEVOTHYROXINE SODIUM PO      Take 88 mcg by mouth daily Patient does not know the dose.        NAPROXEN PO      Take by mouth as needed for moderate pain        polyethylene glycol powder    MIRALAX    510 g    Take 17 g (1 capful) by mouth daily    Constipation, unspecified constipation type       sennosides 8.6 MG tablet    SENOKOT    120 tablet    Take 2 tablets by mouth 2 times daily        traMADol 50 MG tablet    ULTRAM    10 tablet    Take 1 tablet (50 mg) by mouth every 6 hours as needed for moderate pain        TYLENOL PO      Take 500 mg by mouth

## 2017-07-09 ENCOUNTER — HOSPITAL ENCOUNTER (EMERGENCY)
Facility: CLINIC | Age: 27
Discharge: HOME | End: 2017-07-09
Attending: FAMILY MEDICINE | Admitting: FAMILY MEDICINE
Payer: COMMERCIAL

## 2017-07-09 VITALS
TEMPERATURE: 98.2 F | OXYGEN SATURATION: 99 % | DIASTOLIC BLOOD PRESSURE: 73 MMHG | SYSTOLIC BLOOD PRESSURE: 117 MMHG | BODY MASS INDEX: 37.39 KG/M2 | HEART RATE: 83 BPM | RESPIRATION RATE: 16 BRPM | WEIGHT: 211.06 LBS

## 2017-07-09 DIAGNOSIS — R06.00 DYSPNEA, UNSPECIFIED TYPE: ICD-10-CM

## 2017-07-09 DIAGNOSIS — R10.84 ABDOMINAL PAIN, GENERALIZED: ICD-10-CM

## 2017-07-09 DIAGNOSIS — M62.81 GENERALIZED MUSCLE WEAKNESS: ICD-10-CM

## 2017-07-09 LAB
ALBUMIN SERPL-MCNC: 3.3 G/DL (ref 3.4–5)
ALBUMIN UR-MCNC: NEGATIVE MG/DL
ALP SERPL-CCNC: 66 U/L (ref 40–150)
ALT SERPL W P-5'-P-CCNC: 22 U/L (ref 0–50)
APPEARANCE UR: CLEAR
AST SERPL W P-5'-P-CCNC: 19 U/L (ref 0–45)
BASOPHILS # BLD AUTO: 0 10E9/L (ref 0–0.2)
BASOPHILS NFR BLD AUTO: 0.3 %
BILIRUB DIRECT SERPL-MCNC: <0.1 MG/DL (ref 0–0.2)
BILIRUB SERPL-MCNC: 0.3 MG/DL (ref 0.2–1.3)
BILIRUB UR QL STRIP: NEGATIVE
COLOR UR AUTO: YELLOW
DIFFERENTIAL METHOD BLD: NORMAL
EOSINOPHIL # BLD AUTO: 0.2 10E9/L (ref 0–0.7)
EOSINOPHIL NFR BLD AUTO: 3.1 %
ERYTHROCYTE [DISTWIDTH] IN BLOOD BY AUTOMATED COUNT: 13.1 % (ref 10–15)
GLUCOSE UR STRIP-MCNC: NEGATIVE MG/DL
HCG UR QL: NEGATIVE
HCT VFR BLD AUTO: 38.2 % (ref 35–47)
HGB BLD-MCNC: 12.1 G/DL (ref 11.7–15.7)
HGB UR QL STRIP: NEGATIVE
IMM GRANULOCYTES # BLD: 0 10E9/L (ref 0–0.4)
IMM GRANULOCYTES NFR BLD: 0.3 %
KETONES UR STRIP-MCNC: NEGATIVE MG/DL
LEUKOCYTE ESTERASE UR QL STRIP: NEGATIVE
LYMPHOCYTES # BLD AUTO: 2.1 10E9/L (ref 0.8–5.3)
LYMPHOCYTES NFR BLD AUTO: 36.5 %
MCH RBC QN AUTO: 27.4 PG (ref 26.5–33)
MCHC RBC AUTO-ENTMCNC: 31.7 G/DL (ref 31.5–36.5)
MCV RBC AUTO: 86 FL (ref 78–100)
MONOCYTES # BLD AUTO: 0.3 10E9/L (ref 0–1.3)
MONOCYTES NFR BLD AUTO: 5.9 %
NEUTROPHILS # BLD AUTO: 3.1 10E9/L (ref 1.6–8.3)
NEUTROPHILS NFR BLD AUTO: 53.9 %
NITRATE UR QL: NEGATIVE
NRBC # BLD AUTO: 0 10*3/UL
NRBC BLD AUTO-RTO: 0 /100
PH UR STRIP: 5 PH (ref 5–7)
PLATELET # BLD AUTO: 221 10E9/L (ref 150–450)
PROT SERPL-MCNC: 6.8 G/DL (ref 6.8–8.8)
RBC # BLD AUTO: 4.42 10E12/L (ref 3.8–5.2)
SP GR UR STRIP: 1.02 (ref 1–1.03)
URN SPEC COLLECT METH UR: NORMAL
UROBILINOGEN UR STRIP-MCNC: NORMAL MG/DL (ref 0–2)
WBC # BLD AUTO: 5.8 10E9/L (ref 4–11)

## 2017-07-09 PROCEDURE — 80076 HEPATIC FUNCTION PANEL: CPT | Performed by: STUDENT IN AN ORGANIZED HEALTH CARE EDUCATION/TRAINING PROGRAM

## 2017-07-09 PROCEDURE — 81025 URINE PREGNANCY TEST: CPT | Performed by: STUDENT IN AN ORGANIZED HEALTH CARE EDUCATION/TRAINING PROGRAM

## 2017-07-09 PROCEDURE — 36415 COLL VENOUS BLD VENIPUNCTURE: CPT | Performed by: FAMILY MEDICINE

## 2017-07-09 PROCEDURE — 81003 URINALYSIS AUTO W/O SCOPE: CPT | Performed by: STUDENT IN AN ORGANIZED HEALTH CARE EDUCATION/TRAINING PROGRAM

## 2017-07-09 PROCEDURE — 99283 EMERGENCY DEPT VISIT LOW MDM: CPT | Performed by: FAMILY MEDICINE

## 2017-07-09 PROCEDURE — 85025 COMPLETE CBC W/AUTO DIFF WBC: CPT | Performed by: STUDENT IN AN ORGANIZED HEALTH CARE EDUCATION/TRAINING PROGRAM

## 2017-07-09 PROCEDURE — 99282 EMERGENCY DEPT VISIT SF MDM: CPT | Mod: Z6 | Performed by: FAMILY MEDICINE

## 2017-07-09 ASSESSMENT — ENCOUNTER SYMPTOMS
FEVER: 0
CHILLS: 0
SHORTNESS OF BREATH: 1
RHINORRHEA: 0
DYSURIA: 0
COUGH: 0
SORE THROAT: 0

## 2017-07-09 NOTE — ED AVS SNAPSHOT
[facility], Emergency Department    [address]    [facility] [place] [address]    Phone:  [phone]    Fax:  [phone]                                       Renetta Jolly   MRN: [id number]    Department:  [facility], Emergency Department   Date of Visit:  7/9/2017           Patient Information     Date Of Birth          1990        Your diagnoses for this visit were:     Abdominal pain, generalized     Dyspnea, unspecified type     Generalized muscle weakness        You were seen by Ayan Ingram MD.        Discharge Instructions       Please make an appointment to follow up with OB/Gyn--[facility] (phone: [phone]) or at [facility] in [address], [place] [address] [phone]   as soon as you are able to follow up on your ovarian cysts and abdominal pain.  Take 400mg ibuprofen every 4 hours for your pain and you can also add 650mg of tylenol every 6 hours.    Return to ED if you have fever, chills, or general signs of worsening.          *Abdominal Pain, Unknown Cause (Female)    The exact cause of your abdominal (stomach) pain is not certain. This does not mean that this is something to worry about, or the right tests were not done. Everyone likes to know the exact cause of the problem, but sometimes with abdominal pain, there is no clear-cut cause, and this could be a good thing. The good news is that your symptoms can be treated, and you will feel better.   Your condition does not seem serious now; however, sometimes the signs of a serious problem may take more time to appear. For this reason, it is important for you to watch for any new symptoms, problems, or worsening of your condition.  Over the next few days, the abdominal pain may come and go, or be continuous. Other common symptoms can include nausea and vomiting. Sometimes it can be difficult to tell if you feel nauseous, you may just feel bad and not associate that feeling with  nausea. Constipation, diarrhea, and a fever may go along with the pain.  The pain may continue even if treated correctly over the following days. Depending on how things go, sometimes the cause can become clear and may require further or different treatment. Additional evaluations, medications, or tests may be needed.  Home care  Your health care provider may prescribe medications for pain, symptoms, or an infection.  Follow the health care provider's instructions for taking these medications.  General care    Rest until your next exam. No strenuous activities.    Try to find positions that ease discomfort. A small pillow placed on the abdomen may help relieve pain.    Something warm on your abdomen (such as a heating pad) may help, but be careful not to burn yourself.  Diet    Do not force yourself to eat, especially if having cramps, vomiting, or diarrhea.    Water is important so you do not get dehydrated. Soup may also be good. Sports drinks may also help, especially if they are not too acidic. Make sure you don't drink sugary drinks as this can make things worse. Take liquids in small amounts. Do not guzzle them.    Caffeine sometimes makes the pain and cramping worse.    Avoid dairy products if you have vomiting or diarrhea.    Don't eat large amounts at a time. Wait a few minutes between bites.    Eat a diet low in fiber (called a low-residue diet). Foods allowed include refined breads, white rice, fruit and vegetable juices without pulp, tender meats. These foods will pass more easily through the intestine.    Avoid fried or fatty foods, dairy, alcohol and spicy foods until your symptoms go away.  Follow-up care  Follow up with your health care provider as instructed, or if your pain does not begin to improve in the next 24 hours.  When to seek medical care  Seek prompt medical care if any of the following occur:    Pain gets worse or moves to the right lower abdomen    New or worsening vomiting or  diarrhea    Swelling of the abdomen    Unable to pass stool for more than three days    New fever over 101  F (38.3 C), or rising fever    Blood in vomit or bowel movements (dark red or black color)    Jaundice (yellow color of eyes and skin)    Weakness, dizziness    Chest, arm, back, neck or jaw pain    Unexpected vaginal bleeding or missed period  Call 911  Call emergency services if any of the following occur:    Trouble breathing    Confusion    Fainting or loss of consciousness    Rapid heart rate    Seizure    [phone] [facility], [address] [place] [address], [place] [address]. All rights reserved. This information is not intended as a substitute for professional medical care. Always follow your healthcare professional's instructions.            24 Hour Appointment Hotline       To make an appointment at any [facility], call [phone] ([phone]). If you don't have a family doctor or clinic, we will help you find one. [facility] clinics are conveniently located to serve the needs of you and your family.             Review of your medicines      Our records show that you are taking the medicines listed below. If these are incorrect, please call your family doctor or clinic.        Dose / Directions Last dose taken    LEVOTHYROXINE SODIUM PO   Dose:  88 mcg        Take 88 mcg by mouth daily Patient does not know the dose.   Refills:  0        NAPROXEN PO        Take by mouth as needed for moderate pain   Refills:  0        polyethylene glycol powder   Commonly known as:  MIRALAX   Dose:  1 capful   Quantity:  510 g        Take 17 g (1 capful) by mouth daily   Refills:  1        sennosides 8.6 MG tablet   Commonly known as:  SENOKOT   Dose:  2 tablet   Quantity:  120 tablet        Take 2 tablets by mouth 2 times daily   Refills:  1        traMADol 50 MG tablet   Commonly known as:  ULTRAM   Dose:  50 mg   Quantity:  10 tablet        Take 1 tablet (50 mg) by mouth every 6 hours as needed for  moderate pain   Refills:  0        TYLENOL PO   Dose:  500 mg        Take 500 mg by mouth   Refills:  0                Procedures and tests performed during your visit     CBC with platelets differential    HCG qualitative urine    Hepatic panel    UA reflex to Microscopic and Culture      Orders Needing Specimen Collection     None      Pending Results     No orders found from 7/7/2017 to 7/10/2017.            Pending Culture Results     No orders found from 7/7/2017 to 7/10/2017.            Pending Results Instructions     If you had any lab results that were not finalized at the time of your Discharge, you can call the ED Lab Result RN at [phone]. You will be contacted by this team for any positive Lab results or changes in treatment. The nurses are available 7 days a week from 10A to 6:30P.  You can leave a message 24 hours per day and they will return your call.        Thank you for choosing [facility]       Thank you for choosing [facility] for your care. Our goal is always to provide you with excellent care. Hearing back from our patients is one way we can continue to improve our services. Please take a few minutes to complete the written survey that you may receive in the mail after you visit with us. Thank you!        Care EveryWhere ID     This is your Care EveryWhere ID. This could be used by other organizations to access your [facility] medical records  [id number]        Equal Access to Services     LD PUTNAM : Babak Latham, esther guillermo, jaylene pratt, reece gupta. So [facility] [phone].    ATENCIÓN: Si habla español, tiene a velazquez disposición servicios gratuitos de asistencia lingüística. Llame al [phone].    We comply with applicable federal civil rights laws and [place] laws. We do not discriminate on the basis of race, color, national origin, age, disability sex, sexual orientation or gender identity.            After Visit Summary        This is your record. Keep this with you and show to your community pharmacist(s) and doctor(s) at your next visit.

## 2017-07-09 NOTE — ED PROVIDER NOTES
History     Chief Complaint   Patient presents with     Abdominal Pain     Has had pain in bilateral low abdomen for the past 4 days.  Has history of cyst on right side.  Menses is is late.  LMP 6/3/17.       Shortness of Breath     Has been feeling weak for 2-3 weeks.  Has felt short of breath for 4 days.  Traveled to [facility].  Was there for 2 months.  Returned 6/14/17. Concerned she is anemic.      Patient is a 26 year old female presenting with shortness of breath. The history is provided by the patient. No  was used.   Shortness of Breath   Associated symptoms: abdominal pain    Associated symptoms: no chest pain, no cough, no fever, no sore throat and no vomiting      Renetta Jolly is a 26 year old female who presents with chronic lower abdominal pain, shortness of breath, and weakness.  She has a history of a right sided ovarian cyst and two sponatneous abortions and has multiple clinic and ED visits for her chronic lower abdominal pain.  Today her lower abdominal pain is mild right greater than left with some suprapubic pain.  She took an ibuprofen just before she came to the ED which helped. The shortness of breath with weakness started when she returned from [facility] on Candi 15, 2017.  She's concerned she is anemic.  Stated she had no sick contacts while in [facility] and her  and two children are well.  Denies fever, chills, night sweats, appetite change, weight loss, diarrhea, nausea, and vomiting.      I have reviewed the Medications, Allergies, Past Medical and Surgical History, and Social History in the Epic system.  No street drugs or alcohol or tobacco  Review of Systems   Constitutional: Negative for chills and fever.   HENT: Negative for congestion, rhinorrhea and sore throat.    Respiratory: Positive for shortness of breath. Negative for cough.    Cardiovascular: Negative for chest pain, palpitations and leg swelling.   Gastrointestinal: Positive for abdominal pain.  Negative for constipation, diarrhea, nausea and vomiting.   Genitourinary: Positive for pelvic pain (lower abdominal right greater than left). Negative for dysuria, urgency, vaginal bleeding and vaginal discharge.        Denies preg   Musculoskeletal: Negative for myalgias.   Skin: Negative.    Allergic/Immunologic: Negative for immunocompromised state.   Neurological: Positive for weakness (generalized).   Hematological: Negative.        Physical Exam   BP: 100/49  Pulse: 85  Temp: 97.7  F (36.5  C)  Resp: 16  Weight: 95.7 kg (211 lb 1 oz)  SpO2: 99 %  Physical Exam   Constitutional: She is oriented to person, place, and time. She appears well-developed and well-nourished. No distress.   NO distress at all.  Looking very very comfortable   Neck: Neck supple.   Cardiovascular: Normal rate, regular rhythm and intact distal pulses.    Pulmonary/Chest: Effort normal and breath sounds normal. No respiratory distress. She has no wheezes.   Abdominal: Soft. Bowel sounds are normal. There is tenderness (mild generalized). There is no rebound and no guarding.   Musculoskeletal: She exhibits no edema or tenderness (no calf tenderness or swelling).   Neurological: She is alert and oriented to person, place, and time.   Skin: Skin is warm and dry. She is not diaphoretic.   Nursing note and vitals reviewed.      ED Course     ED Course     Procedures             Critical Care time:  none               Labs Ordered and Resulted from Time of ED Arrival Up to the Time of Departure from the ED   HEPATIC PANEL - Abnormal; Notable for the following:        Result Value    Albumin 3.3 (*)     All other components within normal limits   CBC WITH PLATELETS DIFFERENTIAL   UA MACROSCOPIC WITH REFLEX TO MICRO AND CULTURE   HCG QUALITATIVE URINE            Assessments & Plan (with Medical Decision Making)   Renetta Jolly is a 26 year old female with mild generalized abdominal pain with a benign abdominal exam. She has complaints of shortness  of breath( sats in the upper 90's and   RR @ 14) and weakness.  Her vitals are normal, her physical exam was completely benign, white count and hgb are WNL, HCG negative, and bilirubin, AST, and ALT WNL.  Instructed patient to take 400 mg ibuprofen every 4 hours for her pain and can add acetaminophen 650 mg every 6 hours and follow up with an OB/GYN for her ovarian cyst which was present on distant us that she never followed up. At this point there is no indication for an urgent us as she is very very comfortable and has a benign exam and normal labs. Her symptoms seem to have a high somatic component.    I have reviewed the nursing notes.    I have reviewed the findings, diagnosis, plan and need for follow up with the patient.    New Prescriptions    No medications on file       Final diagnoses:   Abdominal pain, generalized   Dyspnea, unspecified type   Generalized muscle weakness       7/9/2017   [facility], [place], EMERGENCY DEPARTMENT    Amie Staples, FP1     Ayan Ingram MD  07/13/17 2035

## 2017-07-09 NOTE — ED AVS SNAPSHOT
[facility], [place], Emergency Department    [address]    [facility] [address]    Phone:  [phone]    Fax:  [phone]                                       Renetta Jolly   MRN: [id number]    Department:  [facility], Emergency Department   Date of Visit:  7/9/2017           After Visit Summary Signature Page     I have received my discharge instructions, and my questions have been answered. I have discussed any challenges I see with this plan with the nurse or doctor.    ..........................................................................................................................................  Patient/Patient Representative Signature      ..........................................................................................................................................  Patient Representative Print Name and Relationship to Patient    ..................................................               ................................................  Date                                            Time    ..........................................................................................................................................  Reviewed by Signature/Title    ...................................................              ..............................................  Date                                                            Time

## 2017-07-09 NOTE — DISCHARGE INSTRUCTIONS
Please make an appointment to follow up with OB/Gyn--[facility] (phone: [phone]) or at [facility] in [place] [address], [place] [address]  [phone]   as soon as you are able to follow up on your ovarian cysts and abdominal pain.  Take 400mg ibuprofen every 4 hours for your pain and you can also add 650mg of tylenol every 6 hours.    Return to ED if you have fever, chills, or general signs of worsening.          *Abdominal Pain, Unknown Cause (Female)    The exact cause of your abdominal (stomach) pain is not certain. This does not mean that this is something to worry about, or the right tests were not done. Everyone likes to know the exact cause of the problem, but sometimes with abdominal pain, there is no clear-cut cause, and this could be a good thing. The good news is that your symptoms can be treated, and you will feel better.   Your condition does not seem serious now; however, sometimes the signs of a serious problem may take more time to appear. For this reason, it is important for you to watch for any new symptoms, problems, or worsening of your condition.  Over the next few days, the abdominal pain may come and go, or be continuous. Other common symptoms can include nausea and vomiting. Sometimes it can be difficult to tell if you feel nauseous, you may just feel bad and not associate that feeling with nausea. Constipation, diarrhea, and a fever may go along with the pain.  The pain may continue even if treated correctly over the following days. Depending on how things go, sometimes the cause can become clear and may require further or different treatment. Additional evaluations, medications, or tests may be needed.  Home care  Your health care provider may prescribe medications for pain, symptoms, or an infection.  Follow the health care provider's instructions for taking these medications.  General care    Rest until your next exam. No strenuous  activities.    Try to find positions that ease discomfort. A small pillow placed on the abdomen may help relieve pain.    Something warm on your abdomen (such as a heating pad) may help, but be careful not to burn yourself.  Diet    Do not force yourself to eat, especially if having cramps, vomiting, or diarrhea.    Water is important so you do not get dehydrated. Soup may also be good. Sports drinks may also help, especially if they are not too acidic. Make sure you don't drink sugary drinks as this can make things worse. Take liquids in small amounts. Do not guzzle them.    Caffeine sometimes makes the pain and cramping worse.    Avoid dairy products if you have vomiting or diarrhea.    Don't eat large amounts at a time. Wait a few minutes between bites.    Eat a diet low in fiber (called a low-residue diet). Foods allowed include refined breads, white rice, fruit and vegetable juices without pulp, tender meats. These foods will pass more easily through the intestine.    Avoid fried or fatty foods, dairy, alcohol and spicy foods until your symptoms go away.  Follow-up care  Follow up with your health care provider as instructed, or if your pain does not begin to improve in the next 24 hours.  When to seek medical care  Seek prompt medical care if any of the following occur:    Pain gets worse or moves to the right lower abdomen    New or worsening vomiting or diarrhea    Swelling of the abdomen    Unable to pass stool for more than three days    New fever over 101  F (38.3 C), or rising fever    Blood in vomit or bowel movements (dark red or black color)    Jaundice (yellow color of eyes and skin)    Weakness, dizziness    Chest, arm, back, neck or jaw pain    Unexpected vaginal bleeding or missed period  Call 911  Call emergency services if any of the following occur:    Trouble breathing    Confusion    Fainting or loss of consciousness    Rapid heart rate    Seizure    [id number] [facility], [address] [place] [address]  [place] [address]. All rights reserved. This information is not intended as a substitute for professional medical care. Always follow your healthcare professional's instructions.

## 2017-07-11 ENCOUNTER — OFFICE VISIT (OUTPATIENT)
Dept: MIDWIFE SERVICES | Facility: CLINIC | Age: 27
End: 2017-07-11
Payer: COMMERCIAL

## 2017-07-11 VITALS — BODY MASS INDEX: 37.38 KG/M2 | DIASTOLIC BLOOD PRESSURE: 64 MMHG | SYSTOLIC BLOOD PRESSURE: 98 MMHG | WEIGHT: 211 LBS

## 2017-07-11 DIAGNOSIS — N92.6 MISSED MENSES: Primary | ICD-10-CM

## 2017-07-11 LAB — BETA HCG QUAL IFA URINE: POSITIVE

## 2017-07-11 PROCEDURE — 84703 CHORIONIC GONADOTROPIN ASSAY: CPT | Performed by: ADVANCED PRACTICE MIDWIFE

## 2017-07-11 PROCEDURE — 84702 CHORIONIC GONADOTROPIN TEST: CPT | Performed by: ADVANCED PRACTICE MIDWIFE

## 2017-07-11 PROCEDURE — 36415 COLL VENOUS BLD VENIPUNCTURE: CPT | Performed by: ADVANCED PRACTICE MIDWIFE

## 2017-07-11 PROCEDURE — 99202 OFFICE O/P NEW SF 15 MIN: CPT | Performed by: ADVANCED PRACTICE MIDWIFE

## 2017-07-11 ASSESSMENT — PATIENT HEALTH QUESTIONNAIRE - PHQ9: 5. POOR APPETITE OR OVEREATING: NEARLY EVERY DAY

## 2017-07-11 ASSESSMENT — ANXIETY QUESTIONNAIRES
7. FEELING AFRAID AS IF SOMETHING AWFUL MIGHT HAPPEN: NEARLY EVERY DAY
6. BECOMING EASILY ANNOYED OR IRRITABLE: NEARLY EVERY DAY
GAD7 TOTAL SCORE: 21
5. BEING SO RESTLESS THAT IT IS HARD TO SIT STILL: NEARLY EVERY DAY
IF YOU CHECKED OFF ANY PROBLEMS ON THIS QUESTIONNAIRE, HOW DIFFICULT HAVE THESE PROBLEMS MADE IT FOR YOU TO DO YOUR WORK, TAKE CARE OF THINGS AT HOME, OR GET ALONG WITH OTHER PEOPLE: EXTREMELY DIFFICULT
1. FEELING NERVOUS, ANXIOUS, OR ON EDGE: NEARLY EVERY DAY
3. WORRYING TOO MUCH ABOUT DIFFERENT THINGS: NEARLY EVERY DAY
2. NOT BEING ABLE TO STOP OR CONTROL WORRYING: NEARLY EVERY DAY

## 2017-07-11 NOTE — MR AVS SNAPSHOT
After Visit Summary   7/11/2017    Renetta Jolly    MRN: [id number]           Patient Information     Date Of Birth          1990        Visit Information        Provider Department      7/11/2017 3:00 PM Lilliana Conn APRN CNM [facility]        Today's Diagnoses     Missed menses    -  1       Follow-ups after your visit        Follow-up notes from your care team     Return in about 2 days (around 7/13/2017) for Lab Work.      Future tests that were ordered for you today     Open Future Orders        Priority Expected Expires Ordered    HCG, quantitative, pregnancy Routine  7/11/2018 7/11/2017            Who to contact     If you have questions or need follow up information about today's clinic visit or your schedule please contact [facility] directly at [phone].  Normal or non-critical lab and imaging results will be communicated to you by MyChart, letter or phone within 4 business days after the clinic has received the results. If you do not hear from us within 7 days, please contact the clinic through MyChart or phone. If you have a critical or abnormal lab result, we will notify you by phone as soon as possible.  Submit refill requests through [employer] or call your pharmacy and they will forward the refill request to us. Please allow 3 business days for your refill to be completed.          Additional Information About Your Visit        Care EveryWhere ID     This is your Care EveryWhere ID. This could be used by other organizations to access your [place] medical records  [id number]        Your Vitals Were     Last Period Breastfeeding? BMI (Body Mass Index)             06/05/2017 (Exact Date) No 37.38 kg/m2          Blood Pressure from Last 3 Encounters:   07/11/17 98/64   07/09/17 117/73   06/27/17 97/65    Weight from Last 3 Encounters:   07/11/17 211 lb (95.7 kg)   07/09/17 211 lb 1 oz (95.7 kg)   06/27/17 209 lb (94.8 kg)               We Performed the Following     Beta HCG qual IFA urine     HCG, quantitative, pregnancy        Primary Care Provider    Physician No Ref-Primary       No address on file        Equal Access to Services     LD PUTNAM : Hadii aad ku hadfloresuday Noa, esther meredithginnyha, jaylene pratt, reece munirain hayaan gingerchaz bradford laDeweyfransisca gupta. So [facility] [phone].    ATENCIÓN: Si habla español, tiene a velazquez disposición servicios gratuitos de asistencia lingüística. Llame al [phone].    We comply with applicable federal civil rights laws and [place] laws. We do not discriminate on the basis of race, color, national origin, age, disability sex, sexual orientation or gender identity.            Thank you!     Thank you for choosing [facility] [place]  for your care. Our goal is always to provide you with excellent care. Hearing back from our patients is one way we can continue to improve our services. Please take a few minutes to complete the written survey that you may receive in the mail after your visit with us. Thank you!             Your Updated Medication List - Protect others around you: Learn how to safely use, store and throw away your medicines at www.disposemymeds.org.          This list is accurate as of: 7/11/17  4:14 PM.  Always use your most recent med list.                   Brand Name Dispense Instructions for use Diagnosis    LEVOTHYROXINE SODIUM PO      Take 88 mcg by mouth daily Patient does not know the dose.        sennosides 8.6 MG tablet    SENOKOT    120 tablet    Take 2 tablets by mouth 2 times daily        TYLENOL PO      Take 500 mg by mouth

## 2017-07-11 NOTE — PROGRESS NOTES
SUBJECTIVE:                                                   Renetta Jolly is a 26 year old who presents to clinic today for the following health issue(s):  Patient presents with:  Pregnancy Test      HPI:  Here for confirmation of pregnancy, had two positive at home, then was seen in ED for abdominal pain and had negative. They did not do an US d/t negative test. Pain intermittent now, worried because she has had two miscarriages in the past    Patient's last menstrual period was 2017 (exact date).  Menstrual History: amenorrhea - pregnant  Patient is sexually active  .  Using none for contraception.   Health maintenance updated:  yes  STI infx testing offered:  Declined    Last PHQ-9 score on record =   PHQ-9 SCORE 2017   Total Score -   Total Score 9     Last GAD7 score on record =   YU-7 SCORE 2017   Total Score 21     Alcohol Score = 0    Problem list and histories reviewed & adjusted, as indicated.  Additional history: as documented.    Patient Active Problem List   Diagnosis     CARDIOVASCULAR SCREENING; LDL GOAL LESS THAN 160     Intermittent asthma     Obesity     Vitamin D deficiency     Multiple joint pain     Threatened  in first trimester     Need for Tdap vaccination     Encounter for supervision of other normal pregnancy     Pregnant state, incidental     Lumbago     Back pain     Past Surgical History:   Procedure Laterality Date     NO HISTORY OF SURGERY        Social History   Substance Use Topics     Smoking status: Never Smoker     Smokeless tobacco: Never Used     Alcohol use No      Problem (# of Occurrences) Relation (Name,Age of Onset)    Asthma (1) Mother    CEREBROVASCULAR DISEASE (1) Father    DIABETES (1) Maternal Uncle            Current Outpatient Prescriptions   Medication Sig     sennosides (SENOKOT) 8.6 MG tablet Take 2 tablets by mouth 2 times daily     Acetaminophen (TYLENOL PO) Take 500 mg by mouth     LEVOTHYROXINE SODIUM PO Take 88 mcg by  mouth daily Patient does not know the dose.     No current facility-administered medications for this visit.      Allergies   Allergen Reactions     No Known Allergies        ROS:  12 point review of systems negative other than symptoms noted below.  Constitutional: Fatigue and Weight Gain  Respiratory: Shortness of Breath  Gastrointestinal: Constipation and Nausea  Genitourinary: Painful Maiden  Musculoskeletal: Joint Pain and Muscular Weakness  Endocrine: Loss of Hair  Psychiatric: Depression and Banuelos    OBJECTIVE:     BP 98/64  Wt 211 lb (95.7 kg)  LMP 06/05/2017 (Exact Date)  Breastfeeding? No  BMI 37.38 kg/m2  Body mass index is 37.38 kg/(m^2).    PHYSICAL EXAM:  Constitutional:  Appearance: Well nourished, well developed alert, in no acute distress     In-Clinic Test Results:  Results for orders placed or performed in visit on 07/11/17 (from the past 24 hour(s))   Beta HCG qual IFA urine   Result Value Ref Range    Beta HCG Qual IFA Urine Positive (A) NEG       ASSESSMENT/PLAN:                                                        ICD-10-CM    1. Missed menses N92.6 Beta HCG qual IFA urine     HCG, quantitative, pregnancy     HCG, quantitative, pregnancy       COUNSELING:  Discussed serial beta HCG and US when level is high enough  With 2 positive home and negative one at ED, could be lab error  Beta HCG done today, will plan to repeat Thursday  Discussed history of miscarriage and progesterone, no need to check at this point    20 minutes was spent face to face with the patient today discussing her history, diagnosis, and follow-up plan as noted above. Over 50% of the visit was spent in counseling and coordination of care.    Total Visit Time: 20 minutes.       KATT Garland, ELISA

## 2017-07-12 ENCOUNTER — NURSE TRIAGE (OUTPATIENT)
Dept: NURSING | Facility: CLINIC | Age: 27
End: 2017-07-12

## 2017-07-12 ASSESSMENT — PATIENT HEALTH QUESTIONNAIRE - PHQ9: SUM OF ALL RESPONSES TO PHQ QUESTIONS 1-9: 9

## 2017-07-12 ASSESSMENT — ANXIETY QUESTIONNAIRES: GAD7 TOTAL SCORE: 21

## 2017-07-12 NOTE — TELEPHONE ENCOUNTER
Reason for Disposition    [1] Caller requesting NON-URGENT health information AND [2] PCP's office is the best resource    Protocols used: INFORMATION ONLY CALL-ADULT-

## 2017-07-13 DIAGNOSIS — N92.6 MISSED MENSES: ICD-10-CM

## 2017-07-13 LAB — B-HCG SERPL-ACNC: 46 IU/L (ref 0–5)

## 2017-07-13 PROCEDURE — 36415 COLL VENOUS BLD VENIPUNCTURE: CPT | Performed by: ADVANCED PRACTICE MIDWIFE

## 2017-07-13 PROCEDURE — 84702 CHORIONIC GONADOTROPIN TEST: CPT | Performed by: ADVANCED PRACTICE MIDWIFE

## 2017-07-13 ASSESSMENT — ENCOUNTER SYMPTOMS
CONSTIPATION: 0
WEAKNESS: 1
HEMATOLOGIC/LYMPHATIC NEGATIVE: 1
NAUSEA: 0
ABDOMINAL PAIN: 1
VOMITING: 0
DIARRHEA: 0
MYALGIAS: 0
PALPITATIONS: 0

## 2017-07-14 ENCOUNTER — TELEPHONE (OUTPATIENT)
Dept: NURSING | Facility: CLINIC | Age: 27
End: 2017-07-14

## 2017-07-14 LAB — B-HCG SERPL-ACNC: 155 IU/L (ref 0–5)

## 2017-07-14 NOTE — TELEPHONE ENCOUNTER
Left message on patient's voicemail with HCG lab results. Explained it was doubling as expected. Asked her to call clinic if there are questions or concerns.    Jian Jarquin, DNP, APRN, CNM

## 2017-07-14 NOTE — TELEPHONE ENCOUNTER
"Pt calling for lab result from HCQ Quant from yesterday (7/13/17). Her HCG Quant from 7/11/17 was 46. Lab is still \"In Process.\" Informed pt that it is not back yet but once it is the midwife has to review it and then someone will be contacting her. Pt verbalized understanding. Spoke with our lab and they said the machine broke at [facility] and now is working and they are playing catch up. Called [facility], spoke with lab and they said it should be before 4:30 PM. Routing to midwife pool.  "

## 2020-09-28 ENCOUNTER — OFFICE VISIT (OUTPATIENT)
Dept: URGENT CARE | Facility: URGENT CARE | Age: 30
End: 2020-09-28

## 2020-09-28 VITALS
TEMPERATURE: 98.7 F | BODY MASS INDEX: 44.29 KG/M2 | SYSTOLIC BLOOD PRESSURE: 125 MMHG | OXYGEN SATURATION: 100 % | HEART RATE: 89 BPM | WEIGHT: 250 LBS | RESPIRATION RATE: 16 BRPM | DIASTOLIC BLOOD PRESSURE: 78 MMHG

## 2020-09-28 DIAGNOSIS — M79.604 PAIN IN RIGHT LEG: ICD-10-CM

## 2020-09-28 DIAGNOSIS — M79.662 PAIN OF LEFT LOWER LEG: Primary | ICD-10-CM

## 2020-09-28 DIAGNOSIS — E66.01 MORBID OBESITY (H): ICD-10-CM

## 2020-09-28 DIAGNOSIS — R53.83 OTHER FATIGUE: ICD-10-CM

## 2020-09-28 LAB
ALBUMIN SERPL-MCNC: 3.4 G/DL (ref 3.4–5)
ALP SERPL-CCNC: 56 U/L (ref 40–150)
ALT SERPL W P-5'-P-CCNC: 21 U/L (ref 0–50)
ANION GAP SERPL CALCULATED.3IONS-SCNC: 2 MMOL/L (ref 3–14)
AST SERPL W P-5'-P-CCNC: 19 U/L (ref 0–45)
B BURGDOR IGG+IGM SER QL: 1.15 (ref 0–0.89)
BASOPHILS # BLD AUTO: 0 10E9/L (ref 0–0.2)
BASOPHILS NFR BLD AUTO: 0.2 %
BILIRUB SERPL-MCNC: 0.3 MG/DL (ref 0.2–1.3)
BUN SERPL-MCNC: 9 MG/DL (ref 7–30)
CALCIUM SERPL-MCNC: 8.7 MG/DL (ref 8.5–10.1)
CHLORIDE SERPL-SCNC: 107 MMOL/L (ref 94–109)
CK SERPL-CCNC: 169 U/L (ref 30–225)
CO2 SERPL-SCNC: 28 MMOL/L (ref 20–32)
CREAT SERPL-MCNC: 0.61 MG/DL (ref 0.52–1.04)
D DIMER PPP FEU-MCNC: 1.4 UG/ML FEU (ref 0–0.5)
DIFFERENTIAL METHOD BLD: ABNORMAL
EOSINOPHIL # BLD AUTO: 0.1 10E9/L (ref 0–0.7)
EOSINOPHIL NFR BLD AUTO: 2.1 %
ERYTHROCYTE [DISTWIDTH] IN BLOOD BY AUTOMATED COUNT: 14.1 % (ref 10–15)
ERYTHROCYTE [SEDIMENTATION RATE] IN BLOOD BY WESTERGREN METHOD: 18 MM/H (ref 0–20)
GFR SERPL CREATININE-BSD FRML MDRD: >90 ML/MIN/{1.73_M2}
GLUCOSE SERPL-MCNC: 85 MG/DL (ref 70–99)
HCT VFR BLD AUTO: 39.9 % (ref 35–47)
HGB BLD-MCNC: 12.1 G/DL (ref 11.7–15.7)
LYMPHOCYTES # BLD AUTO: 1.5 10E9/L (ref 0.8–5.3)
LYMPHOCYTES NFR BLD AUTO: 28.5 %
MCH RBC QN AUTO: 26.1 PG (ref 26.5–33)
MCHC RBC AUTO-ENTMCNC: 30.3 G/DL (ref 31.5–36.5)
MCV RBC AUTO: 86 FL (ref 78–100)
MONOCYTES # BLD AUTO: 0.4 10E9/L (ref 0–1.3)
MONOCYTES NFR BLD AUTO: 6.9 %
NEUTROPHILS # BLD AUTO: 3.3 10E9/L (ref 1.6–8.3)
NEUTROPHILS NFR BLD AUTO: 62.3 %
PLATELET # BLD AUTO: 211 10E9/L (ref 150–450)
POTASSIUM SERPL-SCNC: 3.9 MMOL/L (ref 3.4–5.3)
PROT SERPL-MCNC: 7.5 G/DL (ref 6.8–8.8)
RBC # BLD AUTO: 4.64 10E12/L (ref 3.8–5.2)
SODIUM SERPL-SCNC: 137 MMOL/L (ref 133–144)
TSH SERPL DL<=0.005 MIU/L-ACNC: 2.53 MU/L (ref 0.4–4)
WBC # BLD AUTO: 5.2 10E9/L (ref 4–11)

## 2020-09-28 PROCEDURE — 86617 LYME DISEASE ANTIBODY: CPT | Mod: 90 | Performed by: PHYSICIAN ASSISTANT

## 2020-09-28 PROCEDURE — 99000 SPECIMEN HANDLING OFFICE-LAB: CPT | Performed by: PHYSICIAN ASSISTANT

## 2020-09-28 PROCEDURE — 82550 ASSAY OF CK (CPK): CPT | Performed by: PHYSICIAN ASSISTANT

## 2020-09-28 PROCEDURE — 80050 GENERAL HEALTH PANEL: CPT | Performed by: PHYSICIAN ASSISTANT

## 2020-09-28 PROCEDURE — 85379 FIBRIN DEGRADATION QUANT: CPT | Performed by: PHYSICIAN ASSISTANT

## 2020-09-28 PROCEDURE — 36415 COLL VENOUS BLD VENIPUNCTURE: CPT | Performed by: PHYSICIAN ASSISTANT

## 2020-09-28 PROCEDURE — 85652 RBC SED RATE AUTOMATED: CPT | Performed by: PHYSICIAN ASSISTANT

## 2020-09-28 PROCEDURE — 86618 LYME DISEASE ANTIBODY: CPT | Performed by: PHYSICIAN ASSISTANT

## 2020-09-28 PROCEDURE — 99205 OFFICE O/P NEW HI 60 MIN: CPT | Performed by: PHYSICIAN ASSISTANT

## 2020-09-30 LAB
B BURGDOR IGG SER QL IB: NEGATIVE
B BURGDOR IGM SER QL IB: NEGATIVE

## 2020-09-30 NOTE — PROGRESS NOTES
SUBJECTIVE:  Chief Complaint   Patient presents with     Musculoskeletal Problem     Pt states she is having bilateral foot and calf pain X 2 weeks      Renetta Jolly is a 29 year old female presents with a chief complaint of bilateral leg pain, swelling and tenderness.  How: no known injury.  The patient complained of mild pain  And has had decreased ROM.  Pain exacerbated by repetitive motion.  Relieved by nothing.  She treated it initially with Tylenol. This is the first time this type of injury has occurred to this patient.     Past Medical History:   Diagnosis Date     Intermittent asthma 2013     SAB (spontaneous )      Thyroid disease      Allergies   Allergen Reactions     No Known Allergies      Social History     Tobacco Use     Smoking status: Never Smoker     Smokeless tobacco: Never Used   Substance Use Topics     Alcohol use: No     Family History   Problem Relation Age of Onset     Asthma Mother      Cerebrovascular Disease Father      Diabetes Maternal Uncle        ROS:  CONSTITUTIONAL:NEGATIVE for fever, chills, change in weight  INTEGUMENTARY/SKIN: POSITIVE for mild swelling of calves  EYES: NEGATIVE for vision changes or irritation  ENT/MOUTH: NEGATIVE for ear, mouth and throat problems  RESP:NEGATIVE for significant cough or SOB  CV: NEGATIVE for chest pain, palpitations or peripheral edema  GI: NEGATIVE for nausea, abdominal pain, heartburn, or change in bowel habits  : negative for and dysuria  MUSCULOSKELETAL: POSITIVE  for calf tenderness, localized tenderness  VASC: NEGATIVE for cool extremiites  NEURO: NEGATIVE for weakness, dizziness or paresthesias    EXAM:   /78   Pulse 89   Temp 98.7  F (37.1  C) (Temporal)   Resp 16   Wt 113.4 kg (250 lb)   SpO2 100%   Breastfeeding Unknown   BMI 44.29 kg/m    Gen: healthy, alert, active and healthy,alert,no distress  Extremity: legs  has swelling and point tenderness of calves.   Vasc: There is not compromise to the distal  circulation.  Pulses are +2 and CRT is brisk  GENERAL APPEARANCE: healthy, alert and no distress  NECK: supple, non-tender to palpation, FROM   CHEST: clear to auscultation  CV: regular rate and rhythm  EXTREMITIES: peripheral pulses normal  MS:  tender to palpation calves  SKIN: Positive for swelling of calves  NEURO: Normal strength and tone, sensory exam grossly normal, mentation intact and speech normal  LYMPHATICS: negative for lymphadenitis    Results for orders placed or performed in visit on 09/28/20   Lyme Disease Rebecca with reflex to WB Serum     Status: Abnormal   Result Value Ref Range    Lyme Disease Antibodies Serum 1.15 (H) 0.00 - 0.89   TSH with free T4 reflex     Status: None   Result Value Ref Range    TSH 2.53 0.40 - 4.00 mU/L   CBC with platelets differential     Status: Abnormal   Result Value Ref Range    WBC 5.2 4.0 - 11.0 10e9/L    RBC Count 4.64 3.8 - 5.2 10e12/L    Hemoglobin 12.1 11.7 - 15.7 g/dL    Hematocrit 39.9 35.0 - 47.0 %    MCV 86 78 - 100 fl    MCH 26.1 (L) 26.5 - 33.0 pg    MCHC 30.3 (L) 31.5 - 36.5 g/dL    RDW 14.1 10.0 - 15.0 %    Platelet Count 211 150 - 450 10e9/L    % Neutrophils 62.3 %    % Lymphocytes 28.5 %    % Monocytes 6.9 %    % Eosinophils 2.1 %    % Basophils 0.2 %    Absolute Neutrophil 3.3 1.6 - 8.3 10e9/L    Absolute Lymphocytes 1.5 0.8 - 5.3 10e9/L    Absolute Monocytes 0.4 0.0 - 1.3 10e9/L    Absolute Eosinophils 0.1 0.0 - 0.7 10e9/L    Absolute Basophils 0.0 0.0 - 0.2 10e9/L    Diff Method Automated Method    Comprehensive metabolic panel     Status: Abnormal   Result Value Ref Range    Sodium 137 133 - 144 mmol/L    Potassium 3.9 3.4 - 5.3 mmol/L    Chloride 107 94 - 109 mmol/L    Carbon Dioxide 28 20 - 32 mmol/L    Anion Gap 2 (L) 3 - 14 mmol/L    Glucose 85 70 - 99 mg/dL    Urea Nitrogen 9 7 - 30 mg/dL    Creatinine 0.61 0.52 - 1.04 mg/dL    GFR Estimate >90 >60 mL/min/[1.73_m2]    GFR Estimate If Black >90 >60 mL/min/[1.73_m2]    Calcium 8.7 8.5 - 10.1 mg/dL     Bilirubin Total 0.3 0.2 - 1.3 mg/dL    Albumin 3.4 3.4 - 5.0 g/dL    Protein Total 7.5 6.8 - 8.8 g/dL    Alkaline Phosphatase 56 40 - 150 U/L    ALT 21 0 - 50 U/L    AST 19 0 - 45 U/L   ESR: Erythrocyte sedimentation rate     Status: None   Result Value Ref Range    Sed Rate 18 0 - 20 mm/h   CK total     Status: None   Result Value Ref Range    CK Total 169 30 - 225 U/L   D dimer, quantitative     Status: Abnormal   Result Value Ref Range    D Dimer 1.4 (H) 0.0 - 0.50 ug/ml FEU   Lyme Conf IgG and IgM by Immunoblot     Status: None   Result Value Ref Range    Lyme Confirm IgG by Immunoblot Negative Negative    Lyme Confirm IgM by Immunoblot Negative Negative         ASSESSMENT/PLAN      ICD-10-CM    1. Pain of left lower leg  M79.662 ESR: Erythrocyte sedimentation rate     CK total     D dimer, quantitative     Lyme Conf IgG and IgM by Immunoblot   2. Pain in right leg  M79.604 ESR: Erythrocyte sedimentation rate     CK total     D dimer, quantitative   3. Other fatigue  R53.83 Lyme Disease Rebecca with reflex to WB Serum     TSH with free T4 reflex     CBC with platelets differential     Comprehensive metabolic panel   4. Morbid obesity (H)  E66.01        Patient sent to the ED for ultrasounds of calves to rule out DVT due to calf pain, swelling and elevated D-dimer  CBC to evaluate for infection or anemia  TSH to evaluate for fatigue  Follow up with PCP for fatigue